# Patient Record
(demographics unavailable — no encounter records)

---

## 2024-12-26 NOTE — HISTORY OF PRESENT ILLNESS
[FreeTextEntry1] : 66 Wf Tic bite x 53922 and labs neg - had white dot - lonestar by description poss Lyme 1994 - brain fog aches, no rashes and labs negative- doxy x 16 weeks - saw Jesse told neg labs - No lab since  She now comes in complaining of periodic outbreaks of a painful lesion on her right buttock associated with pain down her legs generalized fatigue without arthritic complaints.  She states she has been given Valtrex with a have resolved but is concerned about Lyme disease.Has periodic outbreak same area right buttock - not blistering accomp by generalized fatigue - resolves with valtrex apthous ulcers in mouth - q 2 months lesionsShe comes in today without any labsJames

## 2025-01-07 NOTE — HISTORY OF PRESENT ILLNESS
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 pt has hx of lyme disease 1990 and with numbness on skin arms and b/l legs numbness Saw Dr. Huber for her voice Referred by Dr. Benito for hyperparathyroidism. her calcium is range (10.5 to 10.9 ), slowly increasing and pcp ordered   the PTH elevate to 74 on 12/26/2024,   sono on 1/2/2025  Subcentimeter left thyroid nodule. There is 9 x 9 x 7 mm hypoechoic nodule immediately posterior to the lower pole of the right lobe which may represent parathyroid adenoma. Pt is here to f/u results and treatment plan.  at age 18 with renal stone No pancreatitis.

## 2025-01-07 NOTE — PROCEDURE
[Gag Reflex] : gag reflex preventing mirror examination [None] : none [Flexible Endoscope] : examined with the flexible endoscope [Serial Number: ___] : Serial Number: [unfilled] [de-identified] : After patient consents, a FFL is performed.  No lesions in the NPx, OPx, HPx or larynx.  VC are mobile, airway patent.  Patient with persistent changes of LPRD on exam with interarytenoid pachyderma and edema.

## 2025-01-07 NOTE — HISTORY OF PRESENT ILLNESS
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 pt has hx of lyme disease 1990 and with numbness on skin arms and b/l legs numbness Saw Dr. Huber for her voice Referred by Dr. Benito for hyperparathyroidism. her calcium is range (10.5 to 10.9 ), slowly increasing and pcp ordered   the PTH elevate to 74 on 12/26/2024,   sono on 1/2/2025  Subcentimeter left thyroid nodule. There is 9 x 9 x 7 mm hypoechoic nodule immediately posterior to the lower pole of the right lobe which may represent parathyroid adenoma. Pt is here to f/u results and treatment plan.  at age 18 with renal stone No pancreatitis.

## 2025-01-07 NOTE — PHYSICAL EXAM
[Normal] : no rashes [Laryngoscopy Performed] : laryngoscopy was performed, see procedure section for findings [de-identified] : Firmness overlying the R. masseter, with mobile lesion underneath, no TTP, no other LAD - stable. [FreeTextEntry1] : No obvious lesions noted.  There is an area along the right posterior oral tongue which is well demarcated which appears similar to an area of healing mucosa.  There is slight TTP.

## 2025-01-07 NOTE — PHYSICAL EXAM
[Normal] : no rashes [Laryngoscopy Performed] : laryngoscopy was performed, see procedure section for findings [de-identified] : Firmness overlying the R. masseter, with mobile lesion underneath, no TTP, no other LAD - stable. [FreeTextEntry1] : No obvious lesions noted.  There is an area along the right posterior oral tongue which is well demarcated which appears similar to an area of healing mucosa.  There is slight TTP.

## 2025-01-07 NOTE — PROCEDURE
[Gag Reflex] : gag reflex preventing mirror examination [None] : none [Flexible Endoscope] : examined with the flexible endoscope [Serial Number: ___] : Serial Number: [unfilled] [de-identified] : After patient consents, a FFL is performed.  No lesions in the NPx, OPx, HPx or larynx.  VC are mobile, airway patent.  Patient with persistent changes of LPRD on exam with interarytenoid pachyderma and edema.

## 2025-01-13 NOTE — HISTORY OF PRESENT ILLNESS
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 pt has hx of lyme disease 1990 and with numbness on skin arms and b/l legs numbness Saw Dr. Huber for her voice Referred by Dr. Benito for hyperparathyroidism. her calcium is range (10.5 to 10.9 ), slowly increasing and pcp ordered   the PTH elevate to 74 on 12/26/2024,   sono on 1/2/2025  Subcentimeter left thyroid nodule. There is 9 x 9 x 7 mm hypoechoic nodule immediately posterior to the lower pole of the right lobe which may represent parathyroid adenoma. Pt is here to discuss 4 D CT result and surgery schedule 1/17/2025 at age 18 with renal stone No pancreatitis.

## 2025-01-13 NOTE — REASON FOR VISIT
[Subsequent Evaluation] : a subsequent evaluation for [FreeTextEntry2] : discuss surgery hyperparathyrodism

## 2025-01-13 NOTE — DATA REVIEWED
[de-identified] : CT Neck independently interpreted - suspicious right inferior adenoma.  A secondary lesion is also noted left inferior.  There is effacement along the L. pyriform sinus.

## 2025-01-13 NOTE — PHYSICAL EXAM
[Normal] : no rashes [de-identified] : Firmness overlying the R. masseter, with mobile lesion underneath, no TTP, no other LAD - stable. [FreeTextEntry1] : No obvious lesions noted.  There is an area along the right posterior oral tongue which is well demarcated which appears similar to an area of healing mucosa.  There is slight TTP.

## 2025-01-23 NOTE — HISTORY OF PRESENT ILLNESS
[de-identified] : 65yo female who is s/p right inferior parathyroidectomy with Dr. Guido on 1/17/2025 for hyperparathyroidism.  Presents today for suture removal. Doing well. Experienced some discomfort while swallowing, but now is gone. Denies pain, dysphagia,  odynophagia, dysphonia and or dyspnea  She has a follow up with her PCP Dr. Benito on Monday.

## 2025-01-28 NOTE — HISTORY OF PRESENT ILLNESS
[de-identified] : HALLEY DONALDSON is a 66 year F who presents today to f/u on several medical  concerns: 1-primary hyperparathyroidism. She underwent parathyroidectomy with Dr. Guido on 1/17/25 with removal of a Right inferior parathyroid adenoma. Intra-operatively when clamped PTH decreased from 80s>40s. There is a known left sided parathyroid adenoma- It was not removed.  She is planning to leave for [place] in a few days and would like to have a new baseline done for the PTH and calcium levels.  2-She also requests that her B12 level be checked. 3-H/O recurrent Right Buttock Herpes Simplex. responsive tp Valtrex-from [place]. Needs refills for trip to [place] 'just in case'. 4- finally, she is here for BP check and will need to have a medication supply for her trip to [place].

## 2025-01-28 NOTE — PHYSICAL EXAM
[Normal] : no acute distress, well nourished, well developed and well-appearing [Normal Sclera/Conjunctiva] : normal sclera/conjunctiva [Normal Outer Ear/Nose] : the outer ears and nose were normal in appearance [No Respiratory Distress] : no respiratory distress  [No Accessory Muscle Use] : no accessory muscle use [No Focal Deficits] : no focal deficits [Normal Gait] : normal gait [Speech Grossly Normal] : speech grossly normal [Normal Affect] : the affect was normal [Normal Mood] : the mood was normal [de-identified] : healing right parathyroidectomy incision (no erythema or drainage noted on inspection)

## 2025-01-28 NOTE — PLAN
[FreeTextEntry1] : ,.  THIS VISIT WAS PART OF AN ONGOING LONGITUDAL RELATIONSHIP DESIGNED TO ADDRESS THE MAJORITY OF THE PATIENT'S HEALTH CARE NEEDS WITH CONSISTENCY OVER A LONG PERIOD OF TIME.

## 2025-05-12 NOTE — PHYSICAL EXAM
[Normal] : no rashes [de-identified] : Firmness overlying the R. masseter, with mobile lesion underneath, no TTP, no other LAD - stable.  Neck incision healing well. [FreeTextEntry1] : No obvious lesions noted.  There is an area along the right posterior oral tongue which is well demarcated which appears similar to an area of healing mucosa.  There is slight TTP.

## 2025-05-12 NOTE — REASON FOR VISIT
[Subsequent Evaluation] : a subsequent evaluation for [FreeTextEntry2] : s/p right inferior parathyroidectomy  hyperparathyrodism

## 2025-05-12 NOTE — PROCEDURE
[Gag Reflex] : gag reflex preventing mirror examination [None] : none [Flexible Endoscope] : examined with the flexible endoscope [Serial Number: ___] : Serial Number: [unfilled] [de-identified] : After patient consents, a FFL is performed.  No lesions in the NPx, OPx, HPx or larynx.  VC are mobile, airway patent.  Patient with persistent mild changes of LPRD.

## 2025-05-12 NOTE — HISTORY OF PRESENT ILLNESS
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 pt has hx of lyme disease 1990 and with numbness on skin arms and b/l legs numbness Saw Dr. Huber for her voice and continue to f/u Pt is s/p right inferior parathyroidectomy with Dr. Guido on 1/17/2025 for hyperparathyroidism.  path Parathyroid adenoma. last PTH and elvira on 1/27/2025 WNL. PT c.o PND, mucus in throat, voice on and off - raspy was in urgent care in [place] for coughing, tx with medrodose and coughing and zpack.

## 2025-05-15 NOTE — HISTORY OF PRESENT ILLNESS
[de-identified] : HALLEY DONALDSON is a 66 year F with HTN, HLD who is s/p recent parathyroidectomy who presents today for f/u. She is feeling well overall- just returned from wintering in [place].  She is requesting updated labs for both PTH and calcium to make sure her levels are 'ok'. She underwent surgery for hyperparathyroidism about 5 mos ago.

## 2025-05-15 NOTE — HISTORY OF PRESENT ILLNESS
[de-identified] : HALLEY DONALDSON is a 66 year F with HTN, HLD who is s/p recent parathyroidectomy who presents today for f/u. She is feeling well overall- just returned from wintering in [place].  She is requesting updated labs for both PTH and calcium to make sure her levels are 'ok'. She underwent surgery for hyperparathyroidism about 5 mos ago.

## 2025-05-15 NOTE — PHYSICAL EXAM
[Normal Sclera/Conjunctiva] : normal sclera/conjunctiva [Normal Outer Ear/Nose] : the outer ears and nose were normal in appearance [Grossly Normal Strength/Tone] : grossly normal strength/tone [Normal Gait] : normal gait [Speech Grossly Normal] : speech grossly normal [Normal Affect] : the affect was normal [Normal Mood] : the mood was normal [Normal] : no respiratory distress, lungs were clear to auscultation bilaterally and no accessory muscle use [Normal Rate] : normal rate  [Regular Rhythm] : with a regular rhythm [Normal S1, S2] : normal S1 and S2 [de-identified] : healed surgical incision to the right of center

## 2025-05-15 NOTE — PHYSICAL EXAM
[Normal Sclera/Conjunctiva] : normal sclera/conjunctiva [Normal Outer Ear/Nose] : the outer ears and nose were normal in appearance [Grossly Normal Strength/Tone] : grossly normal strength/tone [Normal Gait] : normal gait [Speech Grossly Normal] : speech grossly normal [Normal Affect] : the affect was normal [Normal Mood] : the mood was normal [Normal] : no respiratory distress, lungs were clear to auscultation bilaterally and no accessory muscle use [Normal Rate] : normal rate  [Regular Rhythm] : with a regular rhythm [Normal S1, S2] : normal S1 and S2 [de-identified] : healed surgical incision to the right of center